# Patient Record
Sex: MALE | Race: WHITE | NOT HISPANIC OR LATINO | URBAN - METROPOLITAN AREA
[De-identification: names, ages, dates, MRNs, and addresses within clinical notes are randomized per-mention and may not be internally consistent; named-entity substitution may affect disease eponyms.]

---

## 2017-09-06 ENCOUNTER — OUTPATIENT (OUTPATIENT)
Dept: OUTPATIENT SERVICES | Facility: HOSPITAL | Age: 27
LOS: 1 days | End: 2017-09-06

## 2017-10-11 ENCOUNTER — OUTPATIENT (OUTPATIENT)
Dept: OUTPATIENT SERVICES | Facility: HOSPITAL | Age: 27
LOS: 1 days | End: 2017-10-11

## 2018-03-09 ENCOUNTER — TRANSCRIPTION ENCOUNTER (OUTPATIENT)
Age: 28
End: 2018-03-09

## 2018-03-20 ENCOUNTER — RECORD ABSTRACTING (OUTPATIENT)
Age: 28
End: 2018-03-20

## 2018-03-20 DIAGNOSIS — Z78.9 OTHER SPECIFIED HEALTH STATUS: ICD-10-CM

## 2018-03-20 DIAGNOSIS — Z80.0 FAMILY HISTORY OF MALIGNANT NEOPLASM OF DIGESTIVE ORGANS: ICD-10-CM

## 2018-03-20 DIAGNOSIS — J45.909 UNSPECIFIED ASTHMA, UNCOMPLICATED: ICD-10-CM

## 2018-03-20 DIAGNOSIS — Z87.42 PERSONAL HISTORY OF OTHER DISEASES OF THE FEMALE GENITAL TRACT: ICD-10-CM

## 2018-03-20 PROBLEM — Z00.00 ENCOUNTER FOR PREVENTIVE HEALTH EXAMINATION: Status: ACTIVE | Noted: 2018-03-20

## 2018-04-13 ENCOUNTER — APPOINTMENT (OUTPATIENT)
Dept: OBGYN | Facility: CLINIC | Age: 28
End: 2018-04-13
Payer: COMMERCIAL

## 2018-04-13 VITALS
WEIGHT: 170 LBS | SYSTOLIC BLOOD PRESSURE: 104 MMHG | BODY MASS INDEX: 27.32 KG/M2 | DIASTOLIC BLOOD PRESSURE: 56 MMHG | HEIGHT: 66 IN

## 2018-04-13 DIAGNOSIS — N63.0 UNSPECIFIED LUMP IN UNSPECIFIED BREAST: ICD-10-CM

## 2018-04-13 DIAGNOSIS — Z78.9 OTHER SPECIFIED HEALTH STATUS: ICD-10-CM

## 2018-04-13 PROCEDURE — 99395 PREV VISIT EST AGE 18-39: CPT

## 2018-04-13 PROCEDURE — 99213 OFFICE O/P EST LOW 20 MIN: CPT | Mod: 25

## 2018-04-13 RX ORDER — VENLAFAXINE HYDROCHLORIDE 150 MG/1
150 CAPSULE, EXTENDED RELEASE ORAL
Qty: 30 | Refills: 0 | Status: COMPLETED | COMMUNITY
Start: 2017-12-18

## 2018-04-13 RX ORDER — VENLAFAXINE HYDROCHLORIDE 75 MG/1
75 CAPSULE, EXTENDED RELEASE ORAL
Qty: 30 | Refills: 0 | Status: COMPLETED | COMMUNITY
Start: 2018-01-19

## 2018-04-13 RX ORDER — NORGESTIMATE AND ETHINYL ESTRADIOL 0.25-0.035
0.25-35 KIT ORAL
Qty: 28 | Refills: 0 | Status: COMPLETED | COMMUNITY
Start: 2017-12-20

## 2018-04-13 RX ORDER — ALPRAZOLAM 0.25 MG/1
0.25 TABLET ORAL
Qty: 90 | Refills: 0 | Status: COMPLETED | COMMUNITY
Start: 2017-12-18

## 2018-04-13 RX ORDER — ARIPIPRAZOLE 2 MG/1
2 TABLET ORAL
Qty: 30 | Refills: 0 | Status: COMPLETED | COMMUNITY
Start: 2017-12-18

## 2018-04-16 LAB — HPV HIGH+LOW RISK DNA PNL CVX: NOT DETECTED

## 2018-04-18 LAB — CYTOLOGY CVX/VAG DOC THIN PREP: NORMAL

## 2019-06-03 PROBLEM — Z78.9 BIRTH CONTROL: Status: ACTIVE | Noted: 2018-03-20

## 2020-10-02 ENCOUNTER — TRANSCRIPTION ENCOUNTER (OUTPATIENT)
Age: 30
End: 2020-10-02

## 2020-10-06 ENCOUNTER — TRANSCRIPTION ENCOUNTER (OUTPATIENT)
Age: 30
End: 2020-10-06

## 2021-06-09 ENCOUNTER — APPOINTMENT (OUTPATIENT)
Dept: PLASTIC SURGERY | Facility: CLINIC | Age: 31
End: 2021-06-09
Payer: COMMERCIAL

## 2021-06-09 VITALS
WEIGHT: 198 LBS | HEIGHT: 66 IN | DIASTOLIC BLOOD PRESSURE: 98 MMHG | BODY MASS INDEX: 31.82 KG/M2 | HEART RATE: 59 BPM | TEMPERATURE: 97.8 F | SYSTOLIC BLOOD PRESSURE: 138 MMHG

## 2021-06-09 DIAGNOSIS — Z82.69 FAMILY HISTORY OF OTHER DISEASES OF THE MUSCULOSKELETAL SYSTEM AND CONNECTIVE TISSUE: ICD-10-CM

## 2021-06-09 DIAGNOSIS — Z60.2 PROBLEMS RELATED TO LIVING ALONE: ICD-10-CM

## 2021-06-09 PROCEDURE — 99204 OFFICE O/P NEW MOD 45 MIN: CPT

## 2021-06-09 SDOH — SOCIAL STABILITY - SOCIAL INSECURITY: PROBLEMS RELATED TO LIVING ALONE: Z60.2

## 2021-06-11 PROBLEM — Z82.69 FAMILY HISTORY OF OSTEOARTHRITIS: Status: ACTIVE | Noted: 2021-06-09

## 2021-06-11 PROBLEM — Z60.2 LIVES ALONE: Status: ACTIVE | Noted: 2021-06-09

## 2021-06-11 RX ORDER — NORGESTIMATE AND ETHINYL ESTRADIOL 0.25-0.035
0.25-35 KIT ORAL
Qty: 90 | Refills: 1 | Status: DISCONTINUED | COMMUNITY
Start: 2018-04-13 | End: 2021-06-11

## 2021-06-11 RX ORDER — ALPRAZOLAM 0.5 MG/1
0.5 TABLET ORAL
Refills: 0 | Status: DISCONTINUED | COMMUNITY
End: 2021-06-11

## 2021-06-11 RX ORDER — LEVOTHYROXINE SODIUM 75 UG/1
75 TABLET ORAL
Refills: 0 | Status: DISCONTINUED | COMMUNITY
End: 2021-06-11

## 2021-06-11 NOTE — ASSESSMENT
[FreeTextEntry1] : The patient is a reasonable candidate for a left radial forearm phalloplasty.  He expressed understanding of the stages and intensity of the procedure.  He will need to undergo a hysterectomy, and clear the hair of the volar and radial forearm on the left prior to proceeding with the first stage.  However, it is reasonable that the first stage could be combined with laparoscopic hysterectomy.\par \par He agrees to be contacted by the [facility] transgender program for resources for hysterectomy and hair removal.

## 2021-06-11 NOTE — HISTORY OF PRESENT ILLNESS
[FreeTextEntry1] : 30-year-old right-hand-dominant man designated female birth (Hugh; he/him) presents for consultation for phalloplasty.  He has been on testosterone for 2.5 years.  He is specific goals are to be able to urinate while standing and to be able to have penetrative intercourse with a partner.  He is interested in vaginectomy, glansplasty, and placement of erectile and testicular prostheses.  He is expecting to need to use a forearm donor site.  He has not had a hysterectomy, and states that he does not desire to have any fertility preservation.  He has not started any hair removal.\par \par He is here with his mother, Sussy, who states she would be supportive.  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  He lives alone, but would stay with his parents after surgery.  He denies nicotine use, endorses some alcohol use, and denies recreational drug use.

## 2021-06-11 NOTE — PHYSICAL EXAM
[de-identified] : NAD.  BMI 32.0. [de-identified] : Normal respiratory effort. [de-identified] : The clitoris is virilized.  Modest labia minora.  No visible lesions or abnormalities of the introitus.  Visible scarring. [de-identified] : Arsenio test on left and right sides demonstrate intact palmar arch.  No scarring of the forearms. Normal range of motion of digits.  Tattoos present. [de-identified] : Normal affect.  Appropriate.

## 2021-06-14 ENCOUNTER — NON-APPOINTMENT (OUTPATIENT)
Age: 31
End: 2021-06-14

## 2021-06-16 ENCOUNTER — APPOINTMENT (OUTPATIENT)
Dept: OBGYN | Facility: CLINIC | Age: 31
End: 2021-06-16
Payer: COMMERCIAL

## 2021-06-16 VITALS
WEIGHT: 200 LBS | DIASTOLIC BLOOD PRESSURE: 82 MMHG | SYSTOLIC BLOOD PRESSURE: 120 MMHG | BODY MASS INDEX: 32.14 KG/M2 | HEIGHT: 66 IN

## 2021-06-16 VITALS — TEMPERATURE: 97.3 F

## 2021-06-16 DIAGNOSIS — F32.9 ANXIETY DISORDER, UNSPECIFIED: ICD-10-CM

## 2021-06-16 DIAGNOSIS — F41.9 ANXIETY DISORDER, UNSPECIFIED: ICD-10-CM

## 2021-06-16 PROCEDURE — 99385 PREV VISIT NEW AGE 18-39: CPT

## 2021-06-16 PROCEDURE — 99203 OFFICE O/P NEW LOW 30 MIN: CPT | Mod: 25

## 2021-06-16 RX ORDER — ZOLPIDEM TARTRATE 10 MG/1
10 TABLET, FILM COATED ORAL
Refills: 0 | Status: DISCONTINUED | COMMUNITY
End: 2021-06-16

## 2021-06-16 NOTE — PHYSICAL EXAM
[Appropriately responsive] : appropriately responsive [Alert] : alert [No Acute Distress] : no acute distress [Regular Rate Rhythm] : regular rate rhythm [No Murmurs] : no murmurs [Soft] : soft [Non-tender] : non-tender [Non-distended] : non-distended [No HSM] : No HSM [No Lesions] : no lesions [No Mass] : no mass [Oriented x3] : oriented x3 [Examination Of The Breasts] : a normal appearance [No Masses] : no breast masses were palpable [Labia Majora] : normal [Labia Minora] : normal [Normal] : normal [Uterine Adnexae] : normal [FreeTextEntry6] : s/p double mastectomy. No masses palpated  [FreeTextEntry5] : pap was difficult. Pedi speculum used

## 2021-06-16 NOTE — HISTORY OF PRESENT ILLNESS
[FreeTextEntry1] : Hugh is 29yo G0 LMP 6/1 here for new intake and to discuss hysterectomy and bilateral salpingectomy as part of transition from female to male. Last GYN was 2 years ago and states that it was normal.  \par \par

## 2021-06-16 NOTE — DISCUSSION/SUMMARY
[FreeTextEntry1] : Hugh is a 29yo G0 LMP 6/1 desiring Hyst + BS for female to male transition process. We discussed risks including but not limited to bleeding, infection, injury to surrounding organs (bowel, bladder, uterus, tubes, ovaries), injury to blood vessels and nerves that could result in permanent loss of function, regret.  Pt will have phalloplasty and  the vulva and (?) the vagina will be used to help construct the penis. At that time, he says his ovaries will be removed as well by a GYN MD that works with his plastic surgeon. We discussed the health implications of having his ovaries removed at this age (surgical menopause he is to find out exactly which tissues will be utilized prior to surgery to help guide the surgical process (will likely lean towards robo hyst/BS with Dr Al). Pt to obtain medical clearance from PCP (info given to see Dr Brewer as he does not have a PCP right now).  All questions solicited and answered \par \par Will plan for surgery in Aug after discussion with Dr Antoine (with patient's permission) re: oophorectomy and when and why that is being done. He is aware the med clearance is good for 30 days prior to surgery. \par \par RHM: \par - DVS neg x 3\par - Dentist, PCP, Derm as discussed \par - Encouraged healthy diet, exercise, weight loss\par \par Gabby Del Rio MD \par Obstetrician/Gynecologist\par \par

## 2021-06-18 LAB — HPV HIGH+LOW RISK DNA PNL CVX: NOT DETECTED

## 2021-06-24 LAB — CYTOLOGY CVX/VAG DOC THIN PREP: NORMAL

## 2021-08-20 ENCOUNTER — NON-APPOINTMENT (OUTPATIENT)
Age: 31
End: 2021-08-20

## 2021-09-22 ENCOUNTER — APPOINTMENT (OUTPATIENT)
Dept: OBGYN | Facility: CLINIC | Age: 31
End: 2021-09-22
Payer: COMMERCIAL

## 2021-09-22 VITALS
BODY MASS INDEX: 33.75 KG/M2 | HEIGHT: 66 IN | DIASTOLIC BLOOD PRESSURE: 84 MMHG | SYSTOLIC BLOOD PRESSURE: 124 MMHG | WEIGHT: 210 LBS

## 2021-09-22 PROCEDURE — 99212 OFFICE O/P EST SF 10 MIN: CPT

## 2021-09-22 NOTE — HISTORY OF PRESENT ILLNESS
[FreeTextEntry1] : Hugh is 29yo G0 is here for follow up and pre-op visit. Unfortunately, he does not have an appt with Endocrine to discuss ovarian preservation until Dec as he has called several docs and has been unsuccessful with getting in. \par \par He recently had a meeting with his psych to obtain a psych letter of clearance however it has not been faxed to us yet.

## 2021-09-22 NOTE — DISCUSSION/SUMMARY
[FreeTextEntry1] : 29yo G0 undergoing female to male transition here for pre-op discussion for TRH, BSO and cysto. \par \par We discussed the R/B/A of the procedure including but not limited to bleeding (he has no opostiion to receiving blood products), infection (denies allergies)  injury to surrounding organs (bowel, bladder, blood vessels, nerves, ureters ), risk associated with anesthesia. Pt to obtain pre-operative surgical clearance from his PCP ASAP. We discussed post op care (6-8wks out of work, no penetration, follow up, pain management etc), expectations  and precautions as well. \par \par Pt to make appt with Dr Al for meet and greet and also to discuss hold testosterone with Endocrine when he sees them. He will check at the transgender center to see if they have Endocrine on staff that can see him sooner the December. \par \par All questions were solicited and answered\par \par Gabby Del Rio MD \par Obstetrician/Gynecologist\par

## 2021-09-27 ENCOUNTER — APPOINTMENT (OUTPATIENT)
Dept: OBGYN | Facility: CLINIC | Age: 31
End: 2021-09-27
Payer: COMMERCIAL

## 2021-09-27 DIAGNOSIS — F64.0 TRANSSEXUALISM: ICD-10-CM

## 2021-09-27 PROCEDURE — 99441: CPT

## 2021-09-27 NOTE — HISTORY OF PRESENT ILLNESS
[FreeTextEntry1] : Patient is a 30 yr old FtoM here for presurgical consultation. Patient would like gender affirming surgery. has seen plastic surgeon for phalloplasty and metoidioplasty. Patinet here for hysterectomy/BSO c/s. He saw Dr. pablo who recommended JAVED BS. Would recommend BSO as patient has been on testosterone for over 2 years. Pateint agreed to surgery. All r/b/a discussed. \par Will schedule patient for RATL BSO cystoscopy\par advised to see pcp to get clearance for hypothyroid (current not on meds) and asthma.

## 2021-11-04 ENCOUNTER — OUTPATIENT (OUTPATIENT)
Dept: OUTPATIENT SERVICES | Facility: HOSPITAL | Age: 31
LOS: 1 days | End: 2021-11-04
Payer: COMMERCIAL

## 2021-11-04 PROCEDURE — 93010 ELECTROCARDIOGRAM REPORT: CPT

## 2021-11-09 ENCOUNTER — APPOINTMENT (OUTPATIENT)
Dept: DISASTER EMERGENCY | Facility: CLINIC | Age: 31
End: 2021-11-09

## 2021-11-09 DIAGNOSIS — Z01.818 ENCOUNTER FOR OTHER PREPROCEDURAL EXAMINATION: ICD-10-CM

## 2021-11-10 LAB — SARS-COV-2 N GENE NPH QL NAA+PROBE: NOT DETECTED

## 2021-11-12 ENCOUNTER — OUTPATIENT (OUTPATIENT)
Dept: INPATIENT UNIT | Facility: HOSPITAL | Age: 31
LOS: 1 days | End: 2021-11-12
Payer: COMMERCIAL

## 2021-11-12 ENCOUNTER — APPOINTMENT (OUTPATIENT)
Dept: OBGYN | Facility: HOSPITAL | Age: 31
End: 2021-11-12

## 2021-11-12 DIAGNOSIS — Z90.13 ACQUIRED ABSENCE OF BILATERAL BREASTS AND NIPPLES: ICD-10-CM

## 2021-11-12 DIAGNOSIS — N73.6 FEMALE PELVIC PERITONEAL ADHESIONS (POSTINFECTIVE): ICD-10-CM

## 2021-11-12 DIAGNOSIS — G89.18 OTHER ACUTE POSTPROCEDURAL PAIN: ICD-10-CM

## 2021-11-12 DIAGNOSIS — F32.A DEPRESSION, UNSPECIFIED: ICD-10-CM

## 2021-11-12 DIAGNOSIS — F64.9 GENDER IDENTITY DISORDER, UNSPECIFIED: ICD-10-CM

## 2021-11-12 DIAGNOSIS — D25.9 LEIOMYOMA OF UTERUS, UNSPECIFIED: ICD-10-CM

## 2021-11-12 DIAGNOSIS — F41.9 ANXIETY DISORDER, UNSPECIFIED: ICD-10-CM

## 2021-11-12 PROCEDURE — 58571 TLH W/T/O 250 G OR LESS: CPT

## 2021-11-22 ENCOUNTER — APPOINTMENT (OUTPATIENT)
Dept: OBGYN | Facility: CLINIC | Age: 31
End: 2021-11-22
Payer: COMMERCIAL

## 2021-11-22 VITALS
WEIGHT: 205 LBS | HEIGHT: 66 IN | SYSTOLIC BLOOD PRESSURE: 124 MMHG | BODY MASS INDEX: 32.95 KG/M2 | DIASTOLIC BLOOD PRESSURE: 78 MMHG

## 2021-11-22 DIAGNOSIS — T81.89XA OTHER COMPLICATIONS OF PROCEDURES, NOT ELSEWHERE CLASSIFIED, INITIAL ENCOUNTER: ICD-10-CM

## 2021-11-22 PROCEDURE — 99024 POSTOP FOLLOW-UP VISIT: CPT | Mod: KX

## 2021-11-22 NOTE — DISCUSSION/SUMMARY
[FreeTextEntry1] : incisional adhesive reaction\par hydrocortisone x 1 week\par f/u in 5 weeks for cuff check - will only do digital\par patient aware of path

## 2021-11-22 NOTE — HISTORY OF PRESENT ILLNESS
[FreeTextEntry1] : Sameer is s/p RATLH BSO cysto for gender dysphoria. He is doing great. He does have a rah and itching along incisions that started a few days ago. Reports good appetite. No vb. Denies any issues with BM and urination. Not on pain medications. Would like to go back to work - he mentioned his employer with modify his work so he won't be lifting anything heavy.\par \par \par path shows adenomyosis, fibroids and endometriosis, benign EE, tubes with cysts, right endometrioma

## 2021-11-22 NOTE — PHYSICAL EXAM
[Appropriately responsive] : appropriately responsive [Alert] : alert [No Acute Distress] : no acute distress [Soft] : soft [Non-tender] : non-tender [Non-distended] : non-distended [No HSM] : No HSM [Oriented x3] : oriented x3 [FreeTextEntry7] : along 4 incisions are allergic type reactions. Incisions healing well

## 2021-12-20 ENCOUNTER — APPOINTMENT (OUTPATIENT)
Dept: OBGYN | Facility: CLINIC | Age: 31
End: 2021-12-20
Payer: COMMERCIAL

## 2021-12-20 VITALS
HEIGHT: 66 IN | BODY MASS INDEX: 32.95 KG/M2 | SYSTOLIC BLOOD PRESSURE: 135 MMHG | DIASTOLIC BLOOD PRESSURE: 96 MMHG | WEIGHT: 205 LBS

## 2021-12-20 DIAGNOSIS — Z48.89 ENCOUNTER FOR OTHER SPECIFIED SURGICAL AFTERCARE: ICD-10-CM

## 2021-12-20 DIAGNOSIS — Z90.710 ACQUIRED ABSENCE OF BOTH CERVIX AND UTERUS: ICD-10-CM

## 2021-12-20 DIAGNOSIS — Z90.722 ACQUIRED ABSENCE OF OVARIES, BILATERAL: ICD-10-CM

## 2021-12-20 PROCEDURE — 99024 POSTOP FOLLOW-UP VISIT: CPT | Mod: KX

## 2021-12-20 NOTE — DISCUSSION/SUMMARY
[FreeTextEntry1] : post op visit\par doing well\par free to resume previous activities\par f/u for annual visit in one year or prn

## 2021-12-20 NOTE — HISTORY OF PRESENT ILLNESS
[FreeTextEntry1] : Sameer is s/p RATLH BSO cysto for gender dysphoria. He is doing great. Rash is completely resolved. Reports good appetite. No vb. Denies any issues with BM and urination. Not on pain medications. Back to work with no issues.\par \par path shows adenomyosis, fibroids and endometriosis, benign EE, tubes with cysts, right endometrioma

## 2021-12-20 NOTE — PHYSICAL EXAM
[Labia Majora] : normal [Labia Minora] : normal [Normal] : normal [Absent] : absent [Uterine Adnexae] : absent [FreeTextEntry4] : cuff intact

## 2022-02-20 ENCOUNTER — TRANSCRIPTION ENCOUNTER (OUTPATIENT)
Age: 32
End: 2022-02-20

## 2022-12-18 ENCOUNTER — NON-APPOINTMENT (OUTPATIENT)
Age: 32
End: 2022-12-18

## 2024-03-27 ENCOUNTER — APPOINTMENT (OUTPATIENT)
Dept: ENDOCRINOLOGY | Facility: CLINIC | Age: 34
End: 2024-03-27
Payer: COMMERCIAL

## 2024-03-27 VITALS
BODY MASS INDEX: 32.47 KG/M2 | DIASTOLIC BLOOD PRESSURE: 82 MMHG | HEIGHT: 66 IN | WEIGHT: 202 LBS | HEART RATE: 76 BPM | OXYGEN SATURATION: 99 % | SYSTOLIC BLOOD PRESSURE: 118 MMHG

## 2024-03-27 DIAGNOSIS — F64.0 TRANSSEXUALISM: ICD-10-CM

## 2024-03-27 DIAGNOSIS — F41.8 OTHER SPECIFIED ANXIETY DISORDERS: ICD-10-CM

## 2024-03-27 DIAGNOSIS — E66.9 OBESITY, UNSPECIFIED: ICD-10-CM

## 2024-03-27 DIAGNOSIS — Z80.8 FAMILY HISTORY OF MALIGNANT NEOPLASM OF OTHER ORGANS OR SYSTEMS: ICD-10-CM

## 2024-03-27 DIAGNOSIS — Z79.899 TRANSSEXUALISM: ICD-10-CM

## 2024-03-27 DIAGNOSIS — E03.9 HYPOTHYROIDISM, UNSPECIFIED: ICD-10-CM

## 2024-03-27 PROCEDURE — 99204 OFFICE O/P NEW MOD 45 MIN: CPT

## 2024-03-27 RX ORDER — OXYCODONE AND ACETAMINOPHEN 5; 325 MG/1; MG/1
5-325 TABLET ORAL
Qty: 28 | Refills: 0 | Status: COMPLETED | COMMUNITY
Start: 2021-11-12 | End: 2024-03-27

## 2024-03-27 RX ORDER — IBUPROFEN 600 MG/1
600 TABLET, FILM COATED ORAL EVERY 6 HOURS
Qty: 56 | Refills: 0 | Status: COMPLETED | COMMUNITY
Start: 2021-11-12 | End: 2024-03-27

## 2024-03-27 RX ORDER — HYDROCORTISONE 10 MG/G
1 CREAM TOPICAL
Qty: 1 | Refills: 0 | Status: COMPLETED | COMMUNITY
Start: 2021-11-22 | End: 2024-03-27

## 2024-03-27 RX ORDER — POLYETHYLENE GLYCOL 3350 17 G/17G
17 POWDER, FOR SOLUTION ORAL DAILY
Qty: 7 | Refills: 0 | Status: COMPLETED | COMMUNITY
Start: 2021-11-12 | End: 2024-03-27

## 2024-03-27 RX ORDER — SIMETHICONE 80 MG/1
80 TABLET, CHEWABLE ORAL
Qty: 30 | Refills: 0 | Status: COMPLETED | COMMUNITY
Start: 2021-11-12 | End: 2024-03-27

## 2024-03-27 NOTE — REASON FOR VISIT
[Initial Evaluation] : an initial evaluation [Hypothyroidism] : hypothyroidism [Transgender Care] : transgender care

## 2024-03-27 NOTE — ASSESSMENT
[FreeTextEntry1] : 1- Transgender Female to Male  s/p s/p hysterectomy and b/l salpingo-oophorectomy by Dr. Al in Nov 2022.  s/p double mastectomy in 2020 at [place].    Plan:  - After the appointment he will call the office today and let me know the testosterone type and dose he was taking, so we could continue with what worked for him in the past.  - Check labs at 4th day after 1 month of treatment to evaluate T level and adjust the dose.  - Information for [facility] for health and wellness was provided.  - Follow up in 3 month   2- Hypothyroidism  Currently on levothyroxine 75 mcg daily Plan:  - Thyroid profile in 1 month    3- Obesity c/b snoring  Plan:  - Sleep study - Will start with [facility].

## 2024-03-27 NOTE — PHYSICAL EXAM
[Obese] : obese [No Respiratory Distress] : no respiratory distress [Oriented x3] : oriented to person, place, and time [Normal Insight/Judgement] : insight and judgment were intact

## 2024-03-27 NOTE — HISTORY OF PRESENT ILLNESS
[FreeTextEntry1] : 33 year old transgender man (pronouns He/Him) with medical h/o obesity (BMI 32), hypothyroidism and depression presented to [facility] care.    - Femal to male transition  Started transition 5 years ago. Started TRT about 4 years ago.  s/p hysterectomy and b/l salpingo-oophorectomy by Dr. Al in Nov 2022.  s/p double mastectomy in 2020 at [place].   No further surgeries.   Following with psychiatrist for anxiety and depression.   He was taking ? 0.35 ml weekly, last injection was about 6 month ago, prescribed by [facility] [place]. It was stopped bc he didn't have insurance for a while.      - Hypothyroidism Labs from Jan 2024- TSH 4.9, FT4 1.1- at the time he had ran out of her medications for few weeks.  Takes levothyroxine 75 mcg daily. recently compliant with medication. Takes the medication correctly.   Works dayshift at school.

## 2024-03-28 ENCOUNTER — APPOINTMENT (OUTPATIENT)
Dept: ENDOCRINOLOGY | Facility: CLINIC | Age: 34
End: 2024-03-28

## 2024-03-28 RX ORDER — LEVOTHYROXINE SODIUM 0.07 MG/1
75 TABLET ORAL
Qty: 90 | Refills: 3 | Status: ACTIVE | COMMUNITY
Start: 2024-03-28 | End: 1900-01-01

## 2024-04-01 DIAGNOSIS — Z78.9 OTHER SPECIFIED HEALTH STATUS: ICD-10-CM

## 2024-04-01 PROBLEM — Z80.8 FAMILY HISTORY OF MALIGNANT MELANOMA: Status: ACTIVE | Noted: 2024-03-27

## 2024-04-01 PROBLEM — F41.8 DEPRESSION WITH ANXIETY: Status: ACTIVE | Noted: 2024-03-27

## 2024-04-01 RX ORDER — VENLAFAXINE HCL 50 MG
TABLET ORAL
Refills: 0 | Status: ACTIVE | COMMUNITY

## 2024-04-01 RX ORDER — ARIPIPRAZOLE 2 MG/1
2 TABLET ORAL DAILY
Refills: 0 | Status: ACTIVE | COMMUNITY

## 2024-04-01 RX ORDER — LEVOTHYROXINE SODIUM 75 UG/1
75 TABLET ORAL DAILY
Refills: 0 | Status: ACTIVE | COMMUNITY

## 2024-04-01 RX ORDER — TESTOSTERONE CYPIONATE 200 MG/ML
200 INJECTION, SOLUTION INTRAMUSCULAR
Qty: 1 | Refills: 0 | Status: ACTIVE | COMMUNITY
Start: 2024-03-28

## 2024-04-01 RX ORDER — ALPRAZOLAM 0.25 MG/1
0.25 TABLET ORAL
Refills: 0 | Status: ACTIVE | COMMUNITY

## 2024-04-08 RX ORDER — TESTOSTERONE CYPIONATE 200 MG/ML
200 INJECTION, SOLUTION INTRAMUSCULAR
Qty: 1 | Refills: 0 | Status: ACTIVE | COMMUNITY
Start: 2024-04-08 | End: 1900-01-01

## 2024-04-17 RX ORDER — SYRINGE W-NEEDLE,DISPOSAB,3 ML 20GX1 1/2"
20G X 1-1/2" SYRINGE, EMPTY DISPOSABLE MISCELLANEOUS
Qty: 100 | Refills: 0 | Status: ACTIVE | COMMUNITY
Start: 2024-04-17 | End: 1900-01-01

## 2024-06-27 ENCOUNTER — APPOINTMENT (OUTPATIENT)
Dept: ENDOCRINOLOGY | Facility: CLINIC | Age: 34
End: 2024-06-27